# Patient Record
Sex: FEMALE | Race: WHITE | NOT HISPANIC OR LATINO | Employment: STUDENT | URBAN - METROPOLITAN AREA
[De-identification: names, ages, dates, MRNs, and addresses within clinical notes are randomized per-mention and may not be internally consistent; named-entity substitution may affect disease eponyms.]

---

## 2023-02-17 ENCOUNTER — HOSPITAL ENCOUNTER (OUTPATIENT)
Dept: LAB | Facility: MEDICAL CENTER | Age: 18
End: 2023-02-17
Payer: COMMERCIAL

## 2023-02-17 LAB
ALT SERPL-CCNC: 16 U/L (ref 2–50)
AST SERPL-CCNC: 23 U/L (ref 12–45)
B-HCG SERPL-ACNC: <1 MIU/ML (ref 0–5)
TRIGL SERPL-MCNC: 45 MG/DL (ref 0–149)

## 2023-02-17 PROCEDURE — 84702 CHORIONIC GONADOTROPIN TEST: CPT

## 2023-02-17 PROCEDURE — 84478 ASSAY OF TRIGLYCERIDES: CPT

## 2023-02-17 PROCEDURE — 84460 ALANINE AMINO (ALT) (SGPT): CPT

## 2023-02-17 PROCEDURE — 36415 COLL VENOUS BLD VENIPUNCTURE: CPT

## 2023-02-17 PROCEDURE — 84450 TRANSFERASE (AST) (SGOT): CPT

## 2023-11-19 ENCOUNTER — OFFICE VISIT (OUTPATIENT)
Dept: URGENT CARE | Facility: CLINIC | Age: 18
End: 2023-11-19
Payer: COMMERCIAL

## 2023-11-19 VITALS
SYSTOLIC BLOOD PRESSURE: 106 MMHG | HEART RATE: 85 BPM | TEMPERATURE: 97.6 F | WEIGHT: 135 LBS | DIASTOLIC BLOOD PRESSURE: 68 MMHG | OXYGEN SATURATION: 98 % | RESPIRATION RATE: 16 BRPM | BODY MASS INDEX: 22.49 KG/M2 | HEIGHT: 65 IN

## 2023-11-19 DIAGNOSIS — R22.0 FACIAL SWELLING: ICD-10-CM

## 2023-11-19 PROCEDURE — 3074F SYST BP LT 130 MM HG: CPT | Performed by: PHYSICIAN ASSISTANT

## 2023-11-19 PROCEDURE — 3078F DIAST BP <80 MM HG: CPT | Performed by: PHYSICIAN ASSISTANT

## 2023-11-19 PROCEDURE — 99203 OFFICE O/P NEW LOW 30 MIN: CPT | Performed by: PHYSICIAN ASSISTANT

## 2023-11-19 RX ORDER — NORETHINDRONE ACETATE AND ETHINYL ESTRADIOL 1.5; .03 MG/1; MG/1
1 TABLET ORAL DAILY
COMMUNITY
Start: 2023-09-28

## 2023-11-19 RX ORDER — DEXAMETHASONE SODIUM PHOSPHATE 10 MG/ML
10 INJECTION INTRAMUSCULAR; INTRAVENOUS ONCE
Status: COMPLETED | OUTPATIENT
Start: 2023-11-19 | End: 2023-11-19

## 2023-11-19 RX ADMIN — DEXAMETHASONE SODIUM PHOSPHATE 10 MG: 10 INJECTION INTRAMUSCULAR; INTRAVENOUS at 11:39

## 2023-11-19 ASSESSMENT — ENCOUNTER SYMPTOMS
DIARRHEA: 0
VOMITING: 0
MYALGIAS: 0
HEADACHES: 1
EYE DISCHARGE: 0
EYE REDNESS: 0
SHORTNESS OF BREATH: 0
COUGH: 0
SORE THROAT: 0
NAUSEA: 0
FEVER: 0

## 2023-11-19 NOTE — PROGRESS NOTES
"Subjective     Aislinn Marcos is a 18 y.o. female who presents with Headache and Eye Problem            HPI    This is a new problem.   The patient presents to clinic complaining of facial swelling x today. The patient states she woke up this morning with swelling to her eyes and lips. The patient states she is also experiencing swelling to her cervical lymph nodes x 1 day.  The patient reports no difficulty swallowing and or difficulty breathing.  She also reports no associated fever.  The patient states she has been experiencing a persistent headache x4 days.  The patient also state she has had some nasal congestion.  The patient reports no additional URI-like symptoms.  No cough.  No ear pain.  No sore throat.  The patient also reports no vomiting or diarrhea.  Patient reports no recent sick contacts.  The patient has taken OTC Benadryl and OTC ibuprofen for her current symptoms.  The patient reports no new exposures to soaps, lotions, or detergents.  No new medications.  No new foods.    PMH:  has no past medical history on file.  MEDS:   Current Outpatient Medications:     KODI 1.5/30 1.5-30 MG-MCG Tab, Take 1 Tablet by mouth every day., Disp: , Rfl:   ALLERGIES: No Known Allergies  SURGHX: History reviewed. No pertinent surgical history.  SOCHX:    FH: Family history was reviewed, no pertinent findings to report    Review of Systems   Constitutional:  Negative for fever.   HENT:  Positive for congestion. Negative for ear pain and sore throat.    Eyes:  Negative for discharge and redness.   Respiratory:  Negative for cough and shortness of breath.    Gastrointestinal:  Negative for diarrhea, nausea and vomiting.   Musculoskeletal:  Negative for myalgias.   Neurological:  Positive for headaches.              Objective     /68 (BP Location: Left arm, Patient Position: Sitting, BP Cuff Size: Adult)   Pulse 85   Temp 36.4 °C (97.6 °F) (Temporal)   Resp 16   Ht 1.651 m (5' 5\")   Wt 61.2 kg (135 lb)   SpO2 " 98%   BMI 22.47 kg/m²      Physical Exam  Constitutional:       General: She is not in acute distress.     Appearance: Normal appearance. She is not ill-appearing.   HENT:      Head: Normocephalic and atraumatic.      Comments:   The patient had mild facial edema to the orbital region involving the upper and lower eyelids, as well as the perioral region involving the upper and lower lips.  No tenderness to palpation.  No overlying erythema.  No increased warmth.  No rashes/lesions.     Right Ear: Tympanic membrane, ear canal and external ear normal.      Left Ear: Tympanic membrane, ear canal and external ear normal.      Nose: Nose normal.      Mouth/Throat:      Mouth: Mucous membranes are moist. No oral lesions or angioedema.      Pharynx: Oropharynx is clear. Uvula midline. No posterior oropharyngeal erythema.      Tonsils: No tonsillar exudate.      Comments: No obvious swelling of the tongue or throat.  Eyes:      Extraocular Movements: Extraocular movements intact.      Conjunctiva/sclera: Conjunctivae normal.   Neck:      Trachea: Phonation normal.   Cardiovascular:      Rate and Rhythm: Normal rate and regular rhythm.      Heart sounds: Normal heart sounds.   Pulmonary:      Effort: Pulmonary effort is normal. No respiratory distress.      Breath sounds: Normal breath sounds. No wheezing.   Musculoskeletal:         General: Normal range of motion.      Cervical back: Normal range of motion and neck supple.   Skin:     General: Skin is warm and dry.   Neurological:      Mental Status: She is alert and oriented to person, place, and time.               Progress:  Decadron 10mg PO given in clinic.     Offered the patient testing for strep pharyngitis and the viral illnesses (including COVID-19, influenza, and RSV), which she politely declined.             Assessment & Plan        1. Facial swelling  - dexamethasone (Decadron) injection (check route below) 10 mg    The patient's presenting symptoms and  physical exam findings are consistent with facial swelling.  The patient woke up this morning with swelling to her eyelids and lips.  The patient has also been experiencing swollen lymph nodes.  On physical exam, the patient had mild facial edema to the orbital region including the upper and lower eyelids, as well as the perioral region including the upper and lower lips.  The patient had no tenderness to palpation, overlying erythema, increased warmth, or rashes/lesions.  The patient's posterior pharynx was clear without erythema or tonsillar hypertrophy/exudates.  The patient had no obvious swelling of the tongue and or throat.  No signs of angioedema were noted.  The remainder the patient's physical exam today in clinic was normal.  The patient's lungs were clear to auscultation without wheezing, and her pulse ox is within normal limits.  The patient is nontoxic and appears in no acute distress.  The patient's vital signs are stable and within normal limits.  She is afebrile today in clinic.  The cause of the patient's current symptoms is unknown at this time.  The patient's current symptoms could be related to a viral illness and/or possible allergic reaction.  The patient declined additional testing today in clinic, including strep pharyngitis, COVID-19, influenza, and RSV.  Patient was given Decadron 10 mg p.o. today in clinic for her current symptoms.  Based on the patient's presenting symptoms and physical exam findings, and low clinical suspicion for an acute skin infection, such as cellulitis.  Advised the patient to monitor for worsening signs or symptoms.  Recommend OTC medications and supportive care for symptomatic management.  Recommend patient follow-up with PCP.  Discussed return precautions with the patient, and she verbalized understanding.    Differential diagnoses, supportive care, and indications for immediate follow-up discussed with patient.   Instructed to return to clinic or nearest  emergency department for any change in condition, further concerns, or worsening of symptoms.    OTC Tylenol or Motrin for fever/discomfort.  OTC antihistamines for symptomatic relief  Apply ice to the affected areas for symptomatically for swelling  Monitor for worsening signs or symptoms  Follow-up with PCP  Return to clinic or go to the ED if symptoms worsen or fail to improve, or if the patient develop worsening/increasing/persistent swelling, pain/tenderness, increased redness or warmth, rashes/lesions, difficulty breathing, difficulty swallowing, cough, congestion, ear pain, sore throat, decreased p.o. intake, headache, nausea, vomiting, diarrhea, and/or any concerning symptoms.    Discussed plan with the patient, and she agrees to the above.     I personally reviewed prior external notes and test results pertinent to today's visit.  I have independently reviewed and interpreted all diagnostics ordered during this urgent care visit.     Please note that this dictation was created using voice recognition software. I have made every reasonable attempt to correct obvious errors, but I expect that there may be errors of grammar and possibly content that I did not discover before finalizing the note.     This note was electronically signed by Isis Krishna PA-C

## 2024-01-12 ENCOUNTER — OFFICE VISIT (OUTPATIENT)
Dept: URGENT CARE | Facility: CLINIC | Age: 19
End: 2024-01-12
Payer: COMMERCIAL

## 2024-01-12 ENCOUNTER — HOSPITAL ENCOUNTER (OUTPATIENT)
Facility: MEDICAL CENTER | Age: 19
End: 2024-01-12
Payer: COMMERCIAL

## 2024-01-12 VITALS
SYSTOLIC BLOOD PRESSURE: 102 MMHG | HEART RATE: 66 BPM | WEIGHT: 143 LBS | DIASTOLIC BLOOD PRESSURE: 68 MMHG | HEIGHT: 66 IN | RESPIRATION RATE: 16 BRPM | OXYGEN SATURATION: 99 % | BODY MASS INDEX: 22.98 KG/M2 | TEMPERATURE: 98.7 F

## 2024-01-12 DIAGNOSIS — N89.8 VAGINAL DISCHARGE: ICD-10-CM

## 2024-01-12 PROCEDURE — 99213 OFFICE O/P EST LOW 20 MIN: CPT

## 2024-01-12 PROCEDURE — 3074F SYST BP LT 130 MM HG: CPT

## 2024-01-12 PROCEDURE — 3078F DIAST BP <80 MM HG: CPT

## 2024-01-12 PROCEDURE — 87480 CANDIDA DNA DIR PROBE: CPT

## 2024-01-12 PROCEDURE — 87660 TRICHOMONAS VAGIN DIR PROBE: CPT

## 2024-01-12 PROCEDURE — 87510 GARDNER VAG DNA DIR PROBE: CPT

## 2024-01-12 RX ORDER — FLUCONAZOLE 100 MG/1
100 TABLET ORAL DAILY
Qty: 2 TABLET | Refills: 0 | Status: SHIPPED | OUTPATIENT
Start: 2024-01-12

## 2024-01-12 ASSESSMENT — ENCOUNTER SYMPTOMS
CHILLS: 0
DIZZINESS: 0
ABDOMINAL PAIN: 0
COUGH: 0
HEADACHES: 0
NAUSEA: 0
SHORTNESS OF BREATH: 0
VOMITING: 0
SENSORY CHANGE: 0
FEVER: 0

## 2024-01-12 NOTE — PROGRESS NOTES
"Chief Complaint   Patient presents with    Vaginitis     X 3 days, itch, thicker/more discharge, dry feeling, just started using condoms prior to vaginal discomfort        HISTORY OF PRESENT ILLNESS: Patient is a pleasant 18 y.o. female who presents to urgent care today ongoing vaginal itching with discharge for the last 3 days.  Patient has been recently sexually active more and utilizing condoms, she believes this may be a contributing factor.    There are no problems to display for this patient.      Allergies:Ibuprofen    Current Outpatient Medications Ordered in Epic   Medication Sig Dispense Refill    fluconazole (DIFLUCAN) 100 MG Tab Take 1 Tablet by mouth every day. 2 Tablet 0    KODI 1.5/30 1.5-30 MG-MCG Tab Take 1 Tablet by mouth every day.       No current [facility]-ordered facility-administered medications on file.       History reviewed. No pertinent past medical history.    Social History     Tobacco Use    Smoking status: Never    Smokeless tobacco: Never   Vaping Use    Vaping Use: Never used   Substance Use Topics    Alcohol use: Not Currently    Drug use: Not Currently       No family status information on file.   History reviewed. No pertinent family history.    Review of Systems   Constitutional:  Negative for chills, fever and malaise/fatigue.   HENT: Negative.     Respiratory:  Negative for cough and shortness of breath.    Cardiovascular:  Negative for chest pain.   Gastrointestinal:  Negative for abdominal pain, nausea and vomiting.   Genitourinary:         Positive vaginal itching with discharge   Neurological:  Negative for dizziness, sensory change and headaches.       Exam:  /68 (BP Location: Left arm, Patient Position: Sitting, BP Cuff Size: Adult)   Pulse 66   Temp 37.1 °C (98.7 °F) (Temporal)   Resp 16   Ht 1.676 m (5' 6\")   Wt 64.9 kg (143 lb)   SpO2 99%   Physical Exam  Vitals reviewed.   Constitutional:       Appearance: Normal appearance. She is normal weight. She is not " toxic-appearing.   HENT:      Head: Normocephalic.      Right Ear: Tympanic membrane, ear canal and external ear normal. There is no impacted cerumen.      Left Ear: Tympanic membrane, ear canal and external ear normal. There is no impacted cerumen.      Nose: No nasal tenderness or mucosal edema.      Mouth/Throat:      Mouth: Mucous membranes are moist.      Pharynx: No oropharyngeal exudate.      Tonsils: No tonsillar exudate. 1+ on the right. 1+ on the left.   Eyes:      General:         Right eye: No discharge.         Left eye: No discharge.      Extraocular Movements: Extraocular movements intact.      Conjunctiva/sclera: Conjunctivae normal.      Pupils: Pupils are equal, round, and reactive to light.   Cardiovascular:      Rate and Rhythm: Normal rate and regular rhythm.      Pulses: Normal pulses.      Heart sounds: Normal heart sounds. No murmur heard.  Pulmonary:      Effort: Pulmonary effort is normal. No respiratory distress.      Breath sounds: Normal breath sounds. No stridor. No wheezing, rhonchi or rales.   Abdominal:      General: Abdomen is flat. Bowel sounds are normal.      Palpations: Abdomen is soft.      Tenderness: There is no right CVA tenderness, left CVA tenderness or guarding.   Musculoskeletal:         General: No swelling, tenderness, deformity or signs of injury.      Cervical back: Normal range of motion and neck supple. No tenderness.      Right lower leg: No edema.      Left lower leg: No edema.   Skin:     General: Skin is warm and dry.      Capillary Refill: Capillary refill takes less than 2 seconds.      Findings: No bruising, erythema, lesion or rash.   Neurological:      General: No focal deficit present.      Mental Status: She is alert.      Sensory: No sensory deficit.      Motor: No weakness.      Coordination: Coordination normal.      Gait: Gait normal.   Psychiatric:         Mood and Affect: Mood normal.         Behavior: Behavior normal.         Thought Content:  Thought content normal.         Judgment: Judgment normal.         Assessment/Plan:  1. Vaginal discharge  - VAGINAL PATHOGENS DNA PANEL; Future  - fluconazole (DIFLUCAN) 100 MG Tab; Take 1 Tablet by mouth every day.  Dispense: 2 Tablet; Refill: 0    Pelvic exam was declined today.  Offered STI testing as well, at this time patient is declining.  Patient denies any pregnancy, she states she is on birth control and utilizing condoms.  I did go ahead and do Vach path on the patient, at this time we will go ahead and utilize Diflucan.  Patient advised to take the first tablet now, second tablet 24 to 48 hours from now if symptoms persist.  Advised to drink plenty of fluids.  Increase lubrication with intercourse as vaginal dryness could be a contributing factor.  Patient encouraged to drink plenty of fluids, advised to follow-up if she continues to get worse or does not improve.  Patient is aware of the plan of care and agreeable at this time.    Supportive care, differential diagnoses, and indications for immediate follow-up discussed with patient.   Pathogenesis of diagnosis discussed including typical length and natural progression.   Instructed to return to clinic or nearest emergency department for any change in condition, further concerns, or worsening of symptoms.  Patient states understanding of the plan of care and discharge instructions.  Instructed to make an appointment, for follow up, with primary care provider.      Please note that this dictation was created using voice recognition software. I have made every reasonable attempt to correct obvious errors, but I expect that there are errors of grammar and possibly content that I did not discover before finalizing the note.      Yeimi VALDEZ

## 2024-01-13 LAB
CANDIDA DNA VAG QL PROBE+SIG AMP: POSITIVE
G VAGINALIS DNA VAG QL PROBE+SIG AMP: POSITIVE
T VAGINALIS DNA VAG QL PROBE+SIG AMP: NEGATIVE

## 2024-01-16 DIAGNOSIS — N76.0 BV (BACTERIAL VAGINOSIS): ICD-10-CM

## 2024-01-16 DIAGNOSIS — B96.89 BV (BACTERIAL VAGINOSIS): ICD-10-CM

## 2024-01-16 RX ORDER — METRONIDAZOLE 500 MG/1
500 TABLET ORAL 2 TIMES DAILY
Qty: 14 TABLET | Refills: 0 | Status: SHIPPED | OUTPATIENT
Start: 2024-01-16

## 2024-01-16 NOTE — PROGRESS NOTES
Patient tested positive for BV, patient was notified via phone, medications in the form of Flagyl were sent to the pharmacy, reviewed with patient not to drink any alcohol while on this med as well as for at least 24 hours after.  Patient verbalized understanding.  Advised to follow-up if she continues to get worse or does not improve.

## 2024-07-09 ENCOUNTER — APPOINTMENT (OUTPATIENT)
Dept: URGENT CARE | Facility: CLINIC | Age: 19
End: 2024-07-09

## 2024-07-09 ENCOUNTER — OFFICE VISIT (OUTPATIENT)
Dept: URGENT CARE | Facility: CLINIC | Age: 19
End: 2024-07-09
Payer: COMMERCIAL

## 2024-07-09 VITALS
DIASTOLIC BLOOD PRESSURE: 74 MMHG | HEART RATE: 70 BPM | TEMPERATURE: 97.8 F | OXYGEN SATURATION: 97 % | RESPIRATION RATE: 16 BRPM | HEIGHT: 66 IN | BODY MASS INDEX: 22.18 KG/M2 | WEIGHT: 138 LBS | SYSTOLIC BLOOD PRESSURE: 108 MMHG

## 2024-07-09 DIAGNOSIS — L72.9 INFECTED CYST OF SKIN: ICD-10-CM

## 2024-07-09 DIAGNOSIS — L08.9 INFECTED CYST OF SKIN: ICD-10-CM

## 2024-07-09 PROCEDURE — 99214 OFFICE O/P EST MOD 30 MIN: CPT | Performed by: NURSE PRACTITIONER

## 2024-07-09 PROCEDURE — 3078F DIAST BP <80 MM HG: CPT | Performed by: NURSE PRACTITIONER

## 2024-07-09 PROCEDURE — 3074F SYST BP LT 130 MM HG: CPT | Performed by: NURSE PRACTITIONER

## 2024-07-09 RX ORDER — VALACYCLOVIR HYDROCHLORIDE 500 MG/1
500 TABLET, FILM COATED ORAL DAILY
COMMUNITY
Start: 2024-07-09

## 2024-07-09 RX ORDER — SULFAMETHOXAZOLE AND TRIMETHOPRIM 800; 160 MG/1; MG/1
1 TABLET ORAL 2 TIMES DAILY
Qty: 10 TABLET | Refills: 0 | Status: SHIPPED | OUTPATIENT
Start: 2024-07-09 | End: 2024-07-14

## 2024-07-09 ASSESSMENT — ENCOUNTER SYMPTOMS
CHILLS: 0
FEVER: 0
ROS SKIN COMMENTS: CYST

## 2024-07-10 ENCOUNTER — TELEPHONE (OUTPATIENT)
Dept: URGENT CARE | Facility: CLINIC | Age: 19
End: 2024-07-10
Payer: COMMERCIAL